# Patient Record
Sex: MALE | Race: WHITE
[De-identification: names, ages, dates, MRNs, and addresses within clinical notes are randomized per-mention and may not be internally consistent; named-entity substitution may affect disease eponyms.]

---

## 2022-04-23 ENCOUNTER — HOSPITAL ENCOUNTER (EMERGENCY)
Dept: HOSPITAL 94 - ER | Age: 42
Discharge: HOME | End: 2022-04-23
Payer: MEDICAID

## 2022-04-23 VITALS — BODY MASS INDEX: 34.8 KG/M2 | WEIGHT: 271.17 LBS | HEIGHT: 74 IN

## 2022-04-23 VITALS — SYSTOLIC BLOOD PRESSURE: 120 MMHG | DIASTOLIC BLOOD PRESSURE: 80 MMHG

## 2022-04-23 DIAGNOSIS — L02.32: ICD-10-CM

## 2022-04-23 DIAGNOSIS — Z79.2: ICD-10-CM

## 2022-04-23 DIAGNOSIS — E11.9: ICD-10-CM

## 2022-04-23 DIAGNOSIS — L02.33: Primary | ICD-10-CM

## 2022-04-23 DIAGNOSIS — M79.605: ICD-10-CM

## 2022-04-23 PROCEDURE — 99283 EMERGENCY DEPT VISIT LOW MDM: CPT

## 2022-04-23 NOTE — NUR
Pt pink, alert, no acute/resp distress.  Friend at bedside.  Warm blankets 
provided.  Pt sitting on side of bed, bed in lowest position, wheels locked.

## 2023-03-14 LAB
ALBUMIN SERPL BCP-MCNC: 3.7 G/DL (ref 3.4–5)
ALBUMIN/GLOB SERPL: 1 {RATIO} (ref 1.1–1.5)
ALP SERPL-CCNC: 46 IU/L (ref 46–116)
ALT SERPL W P-5'-P-CCNC: 25 U/L (ref 30–65)
ANION GAP SERPL CALCULATED.3IONS-SCNC: 4 MMOL/L (ref 8–16)
AST SERPL W P-5'-P-CCNC: 21 U/L (ref 10–37)
BASOPHILS # BLD AUTO: 0 X10'3 (ref 0–0.2)
BASOPHILS NFR BLD AUTO: 0.5 % (ref 0–1)
BILIRUB SERPL-MCNC: 0.8 MG/DL (ref 0–1)
BUN SERPL-MCNC: 9 MG/DL (ref 7–18)
BUN/CREAT SERPL: 10.2 (ref 5.4–32)
CALCIUM SERPL-MCNC: 9.1 MG/DL (ref 8.5–10.1)
CHLORIDE SERPL-SCNC: 109 MMOL/L (ref 99–107)
CO2 SERPL-SCNC: 27 MMOL/L (ref 24–32)
CREAT SERPL-MCNC: 0.88 MG/DL (ref 0.6–1.1)
EOSINOPHIL # BLD AUTO: 0.3 X10'3 (ref 0–0.9)
EOSINOPHIL NFR BLD AUTO: 4 % (ref 0–6)
ERYTHROCYTE [DISTWIDTH] IN BLOOD BY AUTOMATED COUNT: 15.4 % (ref 11.5–14.5)
GFR SERPL CREATININE-BSD FRML MDRD: > 90 ML/MIN
GLUCOSE SERPL-MCNC: 129 MG/DL (ref 70–104)
HCT VFR BLD AUTO: 45.3 % (ref 42–52)
HGB BLD-MCNC: 14.8 G/DL (ref 14–17.9)
LYMPHOCYTES # BLD AUTO: 1.3 X10'3 (ref 1.1–4.8)
LYMPHOCYTES NFR BLD AUTO: 20.4 % (ref 21–51)
MCH RBC QN AUTO: 26.5 PG (ref 27–31)
MCHC RBC AUTO-ENTMCNC: 32.6 G/DL (ref 33–36.5)
MCV RBC AUTO: 81.4 FL (ref 78–98)
MONOCYTES # BLD AUTO: 0.6 X10'3 (ref 0–0.9)
MONOCYTES NFR BLD AUTO: 9.4 % (ref 2–12)
NEUTROPHILS # BLD AUTO: 4.3 X10'3 (ref 1.8–7.7)
NEUTROPHILS NFR BLD AUTO: 65.7 % (ref 42–75)
PLATELET # BLD AUTO: 216 X10'3 (ref 140–440)
PMV BLD AUTO: 9.3 FL (ref 7.4–10.4)
POTASSIUM SERPL-SCNC: 3.9 MMOL/L (ref 3.4–5.1)
PROT SERPL-MCNC: 7.4 G/DL (ref 6.4–8.2)
RBC # BLD AUTO: 5.57 X10'6 (ref 4.7–6.1)
SODIUM SERPL-SCNC: 140 MMOL/L (ref 135–145)

## 2023-03-21 ENCOUNTER — HOSPITAL ENCOUNTER (OUTPATIENT)
Dept: HOSPITAL 94 - PAS | Age: 43
Discharge: HOME | End: 2023-03-21
Attending: ORTHOPAEDIC SURGERY
Payer: MEDICAID

## 2023-03-21 VITALS — BODY MASS INDEX: 24.27 KG/M2 | HEIGHT: 70 IN | WEIGHT: 169.54 LBS

## 2023-03-21 VITALS — SYSTOLIC BLOOD PRESSURE: 139 MMHG | DIASTOLIC BLOOD PRESSURE: 83 MMHG

## 2023-03-21 VITALS — DIASTOLIC BLOOD PRESSURE: 97 MMHG | SYSTOLIC BLOOD PRESSURE: 152 MMHG

## 2023-03-21 VITALS — SYSTOLIC BLOOD PRESSURE: 163 MMHG | DIASTOLIC BLOOD PRESSURE: 101 MMHG

## 2023-03-21 VITALS — SYSTOLIC BLOOD PRESSURE: 147 MMHG | DIASTOLIC BLOOD PRESSURE: 105 MMHG

## 2023-03-21 VITALS — SYSTOLIC BLOOD PRESSURE: 138 MMHG | DIASTOLIC BLOOD PRESSURE: 75 MMHG

## 2023-03-21 VITALS — DIASTOLIC BLOOD PRESSURE: 79 MMHG | SYSTOLIC BLOOD PRESSURE: 131 MMHG

## 2023-03-21 VITALS — DIASTOLIC BLOOD PRESSURE: 74 MMHG | SYSTOLIC BLOOD PRESSURE: 138 MMHG

## 2023-03-21 VITALS — DIASTOLIC BLOOD PRESSURE: 79 MMHG | SYSTOLIC BLOOD PRESSURE: 133 MMHG

## 2023-03-21 VITALS — DIASTOLIC BLOOD PRESSURE: 91 MMHG | SYSTOLIC BLOOD PRESSURE: 145 MMHG

## 2023-03-21 DIAGNOSIS — E66.9: ICD-10-CM

## 2023-03-21 DIAGNOSIS — Z91.013: ICD-10-CM

## 2023-03-21 DIAGNOSIS — Z79.899: ICD-10-CM

## 2023-03-21 DIAGNOSIS — M75.41: Primary | ICD-10-CM

## 2023-03-21 DIAGNOSIS — G89.18: ICD-10-CM

## 2023-03-21 DIAGNOSIS — M75.51: ICD-10-CM

## 2023-03-21 DIAGNOSIS — Z88.8: ICD-10-CM

## 2023-03-21 DIAGNOSIS — Z79.4: ICD-10-CM

## 2023-03-21 DIAGNOSIS — Z88.0: ICD-10-CM

## 2023-03-21 DIAGNOSIS — Z79.01: ICD-10-CM

## 2023-03-21 DIAGNOSIS — M19.011: ICD-10-CM

## 2023-03-21 DIAGNOSIS — E11.52: ICD-10-CM

## 2023-03-21 DIAGNOSIS — Z98.890: ICD-10-CM

## 2023-03-21 PROCEDURE — 64415 NJX AA&/STRD BRCH PLXS IMG: CPT

## 2023-03-21 PROCEDURE — 80053 COMPREHEN METABOLIC PANEL: CPT

## 2023-03-21 PROCEDURE — 29826 SHO ARTHRS SRG DECOMPRESSION: CPT

## 2023-03-21 PROCEDURE — 36415 COLL VENOUS BLD VENIPUNCTURE: CPT

## 2023-03-21 PROCEDURE — 29824 SHO ARTHRS SRG DSTL CLAVICLC: CPT

## 2023-03-21 PROCEDURE — 93005 ELECTROCARDIOGRAM TRACING: CPT

## 2023-03-21 PROCEDURE — 85025 COMPLETE CBC W/AUTO DIFF WBC: CPT

## 2023-03-21 PROCEDURE — 82948 REAGENT STRIP/BLOOD GLUCOSE: CPT

## 2023-03-21 NOTE — NUR
Received from OR via BED, accompanied by Anesthesiologist and report given by 
Anesthesiologist. PATIENT WAKING UP, NO S/S OF PAIN, V/S WNL, SCD ON, 20G TO LUE, RIGHT drsg 
to shoulder-CDI SLING ON

## 2023-03-21 NOTE — NUR
PATIENT A&OX4, C/O PAIN 3/10 SEE EMAR, V/S WNL, SCD OFF, 20G TO LUE D/C, RIGHT drsg to 
shoulder-CDI SLING ON . I HAVE REVIEWED D/C INSTRUCTIONS WITH PATIENT and they have 
verbalized understanding patient d/c home with all belongings and family gave transport 
home.